# Patient Record
Sex: MALE | Race: WHITE | NOT HISPANIC OR LATINO | Employment: OTHER | URBAN - METROPOLITAN AREA
[De-identification: names, ages, dates, MRNs, and addresses within clinical notes are randomized per-mention and may not be internally consistent; named-entity substitution may affect disease eponyms.]

---

## 2017-03-07 ENCOUNTER — TRANSCRIBE ORDERS (OUTPATIENT)
Dept: ADMINISTRATIVE | Facility: HOSPITAL | Age: 57
End: 2017-03-07

## 2017-03-07 DIAGNOSIS — M79.89 LEFT LEG SWELLING: ICD-10-CM

## 2017-03-07 DIAGNOSIS — I89.0 LYMPHEDEMA: Primary | ICD-10-CM

## 2017-03-14 ENCOUNTER — APPOINTMENT (OUTPATIENT)
Dept: CT IMAGING | Facility: HOSPITAL | Age: 57
End: 2017-03-14
Attending: SURGERY

## 2017-03-20 ENCOUNTER — APPOINTMENT (OUTPATIENT)
Dept: CT IMAGING | Facility: HOSPITAL | Age: 57
End: 2017-03-20
Attending: SURGERY

## 2019-07-25 ENCOUNTER — HOSPITAL ENCOUNTER (OUTPATIENT)
Dept: URGENT CARE | Facility: CLINIC | Age: 59
Discharge: HOME | End: 2019-07-25
Attending: EMERGENCY MEDICINE

## 2019-10-03 ENCOUNTER — HOSPITAL ENCOUNTER (OUTPATIENT)
Dept: URGENT CARE | Facility: CLINIC | Age: 59
Discharge: HOME | End: 2019-10-03

## 2019-10-03 LAB
ALBUMIN SERPL-MCNC: 4.5 G/DL (ref 3.5–5)
ALBUMIN/GLOB SERPL: 1.7 {RATIO} (ref 1.4–2.6)
ALP SERPL-CCNC: 55 U/L (ref 56–119)
ALT SERPL-CCNC: 41 U/L (ref 10–40)
ANION GAP SERPL CALC-SCNC: 17 MMOL/L (ref 8–19)
AST SERPL-CCNC: 30 U/L (ref 15–50)
BILIRUB SERPL-MCNC: 1.03 MG/DL (ref 0.2–1.3)
BUN SERPL-MCNC: 13 MG/DL (ref 5–25)
BUN/CREAT SERPL: 13 {RATIO} (ref 6–20)
CALCIUM SERPL-MCNC: 10.1 MG/DL (ref 8.7–10.4)
CHLORIDE SERPL-SCNC: 101 MMOL/L (ref 99–111)
CONV CO2: 26 MMOL/L (ref 22–32)
CONV TOTAL PROTEIN: 7.1 G/DL (ref 6.3–8.2)
CREAT UR-MCNC: 1.02 MG/DL (ref 0.7–1.2)
GFR SERPLBLD BASED ON 1.73 SQ M-ARVRAT: >60 ML/MIN/{1.73_M2}
GLOBULIN UR ELPH-MCNC: 2.6 G/DL (ref 2–3.5)
GLUCOSE BLD-MCNC: 135 MG/DL (ref 70–99)
GLUCOSE SERPL-MCNC: 128 MG/DL (ref 70–99)
OSMOLALITY SERPL CALC.SUM OF ELEC: 292 MOSM/KG (ref 273–304)
POTASSIUM SERPL-SCNC: 3.8 MMOL/L (ref 3.5–5.3)
SODIUM SERPL-SCNC: 140 MMOL/L (ref 135–147)

## 2019-10-05 LAB — BACTERIA SPEC AEROBE CULT: NORMAL

## 2019-10-06 ENCOUNTER — HOSPITAL ENCOUNTER (OUTPATIENT)
Dept: URGENT CARE | Facility: CLINIC | Age: 59
Discharge: HOME | End: 2019-10-06

## 2020-05-21 ENCOUNTER — HOSPITAL ENCOUNTER (OUTPATIENT)
Dept: URGENT CARE | Facility: CLINIC | Age: 60
Discharge: HOME | End: 2020-05-21
Attending: FAMILY MEDICINE

## 2020-05-23 LAB — BACTERIA SPEC AEROBE CULT: NORMAL

## 2020-06-03 ENCOUNTER — HOSPITAL ENCOUNTER (OUTPATIENT)
Dept: URGENT CARE | Facility: CLINIC | Age: 60
Discharge: HOME | End: 2020-06-03
Attending: EMERGENCY MEDICINE

## 2020-06-25 ENCOUNTER — OFFICE VISIT CONVERTED (OUTPATIENT)
Dept: FAMILY MEDICINE CLINIC | Facility: CLINIC | Age: 60
End: 2020-06-25
Attending: NURSE PRACTITIONER

## 2020-06-25 ENCOUNTER — HOSPITAL ENCOUNTER (OUTPATIENT)
Dept: FAMILY MEDICINE CLINIC | Facility: CLINIC | Age: 60
Discharge: HOME | End: 2020-06-25
Attending: NURSE PRACTITIONER

## 2020-06-25 ENCOUNTER — CONVERSION ENCOUNTER (OUTPATIENT)
Dept: FAMILY MEDICINE CLINIC | Facility: CLINIC | Age: 60
End: 2020-06-25

## 2020-06-25 ENCOUNTER — HOSPITAL ENCOUNTER (OUTPATIENT)
Dept: LAB | Facility: HOSPITAL | Age: 60
Discharge: HOME | End: 2020-06-25
Attending: NURSE PRACTITIONER

## 2020-06-25 LAB
ALBUMIN SERPL-MCNC: 4.5 G/DL (ref 3.5–5)
ALBUMIN/GLOB SERPL: 1.6 {RATIO} (ref 1.4–2.6)
ALP SERPL-CCNC: 55 U/L (ref 56–155)
ALT SERPL-CCNC: 32 U/L (ref 10–40)
ANION GAP SERPL CALC-SCNC: 17 MMOL/L (ref 8–19)
AST SERPL-CCNC: 24 U/L (ref 15–50)
BASOPHILS # BLD AUTO: 0.03 10*3/UL (ref 0–0.2)
BASOPHILS NFR BLD AUTO: 0.4 % (ref 0–3)
BILIRUB SERPL-MCNC: 1.01 MG/DL (ref 0.2–1.3)
BUN SERPL-MCNC: 12 MG/DL (ref 5–25)
BUN/CREAT SERPL: 13 {RATIO} (ref 6–20)
CALCIUM SERPL-MCNC: 9.8 MG/DL (ref 8.7–10.4)
CHLORIDE SERPL-SCNC: 103 MMOL/L (ref 99–111)
CONV ABS IMM GRAN: 0.02 10*3/UL (ref 0–0.2)
CONV CO2: 21 MMOL/L (ref 22–32)
CONV IMMATURE GRAN: 0.3 % (ref 0–1.8)
CONV TOTAL PROTEIN: 7.3 G/DL (ref 6.3–8.2)
CREAT UR-MCNC: 0.93 MG/DL (ref 0.7–1.2)
DEPRECATED RDW RBC AUTO: 44.8 FL (ref 35.1–43.9)
EOSINOPHIL # BLD AUTO: 0.17 10*3/UL (ref 0–0.7)
EOSINOPHIL # BLD AUTO: 2.5 % (ref 0–7)
ERYTHROCYTE [DISTWIDTH] IN BLOOD BY AUTOMATED COUNT: 13.2 % (ref 11.6–14.4)
ERYTHROCYTE [SEDIMENTATION RATE] IN BLOOD: 4 MM/H (ref 0–20)
GFR SERPLBLD BASED ON 1.73 SQ M-ARVRAT: >60 ML/MIN/{1.73_M2}
GLOBULIN UR ELPH-MCNC: 2.8 G/DL (ref 2–3.5)
GLUCOSE SERPL-MCNC: 112 MG/DL (ref 70–99)
HCT VFR BLD AUTO: 50.1 % (ref 42–52)
HGB BLD-MCNC: 16.9 G/DL (ref 14–18)
LYMPHOCYTES # BLD AUTO: 1.82 10*3/UL (ref 1–5)
LYMPHOCYTES NFR BLD AUTO: 27.2 % (ref 20–45)
MCH RBC QN AUTO: 31 PG (ref 27–31)
MCHC RBC AUTO-ENTMCNC: 33.7 G/DL (ref 33–37)
MCV RBC AUTO: 91.9 FL (ref 80–96)
MONOCYTES # BLD AUTO: 0.6 10*3/UL (ref 0.2–1.2)
MONOCYTES NFR BLD AUTO: 9 % (ref 3–10)
NEUTROPHILS # BLD AUTO: 4.06 10*3/UL (ref 2–8)
NEUTROPHILS NFR BLD AUTO: 60.6 % (ref 30–85)
NRBC CBCN: 0 % (ref 0–0.7)
OSMOLALITY SERPL CALC.SUM OF ELEC: 285 MOSM/KG (ref 273–304)
PLATELET # BLD AUTO: 276 10*3/UL (ref 130–400)
PMV BLD AUTO: 11.7 FL (ref 9.4–12.4)
POTASSIUM SERPL-SCNC: 4 MMOL/L (ref 3.5–5.3)
RBC # BLD AUTO: 5.45 10*6/UL (ref 4.7–6.1)
SODIUM SERPL-SCNC: 137 MMOL/L (ref 135–147)
T4 FREE SERPL-MCNC: 1 NG/DL (ref 0.9–1.8)
TSH SERPL-ACNC: 3.68 M[IU]/L (ref 0.27–4.2)
WBC # BLD AUTO: 6.7 10*3/UL (ref 4.8–10.8)

## 2020-06-26 LAB
B BURGDOR IGG+IGM SER-ACNC: <0.91 ISR (ref 0–0.9)
CONV HEPATITIS B SURFACE AG W CONFIRMATION RE: NEGATIVE
HAV IGM SERPL QL IA: NEGATIVE
HBV CORE IGM SERPL QL IA: NEGATIVE
HCV AB SER DONR QL: <0.1 S/CO RATIO (ref 0–0.9)

## 2020-06-27 LAB — BACTERIA SPEC ANAEROBE CULT: NORMAL

## 2020-06-28 LAB
AMPICILLIN SUSC ISLT: <=2
BACTERIA SPEC AEROBE CULT: ABNORMAL
CEFEPIME SUSC ISLT: <=1
CEFTAZIDIME SUSC ISLT: 2
CIPROFLOXACIN SUSC ISLT: <=0.25
CIPROFLOXACIN SUSC ISLT: <=0.5
CONV GENTAMICIN HIGH LEVEL SYNERGY: ABNORMAL
CONV STREPTOMYCIN HIGH LEVEL SYNERGY: ABNORMAL
DAPTOMYCIN SUSC ISLT: 2
DOXYCYCLINE SUSC ISLT: 8
ERYTHROMYCIN SUSC ISLT: 4
GENTAMICIN SUSC ISLT: <=1
LEVOFLOXACIN SUSC ISLT: 0.25
LEVOFLOXACIN SUSC ISLT: 1
LINEZOLID SUSC ISLT: 2
TETRACYCLINE SUSC ISLT: >=16
TOBRAMYCIN SUSC ISLT: <=1
VANCOMYCIN SUSC ISLT: 1

## 2020-06-29 ENCOUNTER — OFFICE VISIT CONVERTED (OUTPATIENT)
Dept: SURGERY | Facility: CLINIC | Age: 60
End: 2020-06-29
Attending: SURGERY

## 2020-06-30 LAB
MYELOPEROXIDASE AB SER-ACNC: 0 AU/ML (ref 0–19)
PROTEINASE 3 (PR-3) AB: 0 AU/ML (ref 0–19)

## 2021-05-10 NOTE — H&P
History and Physical      Patient Name: Lisandro Miles   Patient ID: [id number]   Sex: Male   YOB: 1960    Primary Care Provider: Asiya VAIL   Referring Provider: Asiya VAIL    Visit Date: June 25, 2020    Provider: GENNA Camp   Location: [facility]   Location Address: [address] [place] [address], [address]  [place], [place]  [id number]   Location Phone: [phone]          Chief Complaint  · New patient - establish care      History Of Present Illness  Lisandro Miles is a 60 year old /White male who presents for evaluation and treatment of:      New patient to establish care.    Pt has c/o of rash on legs and arms. It is really bad to two places; left forearm and right inner thigh. Pt has had it for 5 weeks. It continues to spread even after two rounds of steroid/antibiotic.  Pt states it itches on occasion and stings and has opened up into wounds. Pt tries to keep it uncovered as much as possible.    states BP usually 130's/80's but hx white coat HTN      states had very similar episode last fall that resolved with anitbiotics and steroids- specificaly doxy and prednisone- was given this same regimen in ER 2 days ago, no change yet.     States now has red whelp-like patch on right inner arm that is hot to touch that started same day started doxycycline, states has had similar rash like reaction to another med in past but cannot remember what. denies any SOB or tightness in throat.       Past Medical History  Disease Name Date Onset Notes   AC joint arthropathy 09/01/2016 --    Bicipital tendinitis, right 09/01/2016 --    Complete tear of right rotator cuff 09/01/2016 --    Right arm pain --  --    Subacromial impingement, right 09/01/2016 --          Past Surgical History  Procedure Name Date Notes   Shoulder surgery 2018 left         Allergy List  Allergen Name Date Reaction Notes   NO KNOWN DRUG ALLERGIES --  --  --        Allergies Reconciled  Family Medical  "History  Disease Name Relative/Age Notes   *No Known Family History  --          Social History  Finding Status Start/Stop Quantity Notes   Alcohol Never --/-- --  --    Tobacco Never --/-- --  --          Review of Systems  · Constitutional  o Admits  o : fatigue   o Denies  o : night sweats  · Eyes  o Denies  o : double vision, blurred vision  · HENT  o Denies  o : vertigo, recent head injury  · Cardiovascular  o Denies  o : chest pain, irregular heart beats  · Respiratory  o Denies  o : shortness of breath, productive cough  · Gastrointestinal  o Denies  o : nausea, vomiting  · Genitourinary  o Denies  o : dysuria, urinary retention  · Integument  o Admits  o : skin lesions as described in HPI   o Denies  o : hair growth change,   · Neurologic  o Denies  o : altered mental status, seizures  · Musculoskeletal  o Denies  o : joint swelling, limitation of motion  · Endocrine  o Denies  o : cold intolerance, heat intolerance  · Heme-Lymph  o Denies  o : petechiae, lymph node enlargement or tenderness  · Allergic-Immunologic  o Denies  o : frequent illnesses      Vitals  Date Time BP Position Site L\R Cuff Size HR RR TEMP (F) WT  HT  BMI kg/m2 BSA m2 O2 Sat HC       06/25/2020 09:30 /113 Sitting    112 - R  98.2 302lbs 0oz 6'  2\" 38.77 2.67 93 %           manual /90., manual HR88       Physical Examination  · Constitutional  o Appearance  o : well developed, well-nourished, no acute distress  · Head and Face  o Head  o : normocephalic, atraumatic  · Ears, Nose, Mouth and Throat  o Ears  o :   § External Ears  § : external auditory canal appearance normal, no discharge present  § Otoscopic Examination  § : tympanic membranes pearly white/gray bilaterally  o Nose  o :   § External Nose  § : no lesions noted  § Nasopharynx  § : no discharge present  o Oral Cavity  o :   § Oral Mucosa  § : oral mucosa light pink  o Throat  o :   § Oropharynx  § : tonsils without exudate, no palatal " petechiae  · Neck  o Inspection/Palpation  o : normal appearance, no masses or tenderness, trachea midline  o Thyroid  o : gland size normal, nontender, no nodules or masses present on palpation  · Respiratory  o Respiratory Effort  o : breathing unlabored  o Inspection of Chest  o : chest rise symmetric bilaterally  o Auscultation of Lungs  o : clear to auscultation bilaterally throughout inspiration and expiration  · Cardiovascular  o Heart  o :   § Auscultation of Heart  § : regular rate and rhythm, no murmurs, gallops or rubs  o Peripheral Vascular System  o :   § Extremities  § : no edema  · Lymphatic  o Neck  o : no cervical lymphadenopathy, no supraclavicular lymphadenopathy  · Psychiatric  o Mood and Affect  o : mood normal, affect appropriate     right inner thigh with large area erythema with desquamation and blistering, smaller area in center with slightly deeper tissue loss, small amt serosanguineous drainage noted. hot to touch, ttp. left forearm with similar area, few other scattered smaller less significant lesions on chest/abd on left lower leg.   right inner upper arm with large erythematous raised whelp=like patches, hot to touch, non-tender, skin intact,           Assessment  · Screening for depression     V79.0/Z13.89  · Fatigue     780.79/R53.83  · Rash     782.1/R21  possible reaction to drug vs other   · Erosive pustular dermatosis     702.8/L98.8  wound culture done today- awaiting results. labs done to r/o auto-immune/vasculitis/infectious or other causes. referred to [facility] for wound care/poss. debridement and biopsy.attempted derm referral but unable to find anyone to accept insurance at present, pt to look into this   · Adverse effect of other drugs, medicaments and biological substances, initial encounter       Adverse effect of other drugs, medicaments and biological substances, initial encounter     995.29/T50.995A  questionable reaction to doxycycline- discontinue med and will switch to  clindamycin     Problems Reconciled  Plan  · Orders  o ACO-18: Negative screen for clinical depression using a standardized tool () - V79.0/Z13.89 - 2020  o CBC with Auto Diff [facility] (24486) - 780.79/R53.83 - 2020  o CMP [facility] (79327) - 780.79/R53.83 - 2020  o Thyroid Profile (26548, 49678, THYII) - 780.79/R53.83 - 2020  o ACO-39: Current medications updated and reviewed () - - 2020  o ACO-14: Influenza immunization was not administered for reasons documented () - - 2020   pt refused  o Lyme Antibody Screen (IgG/IgM) (20456) - 782.1/R21, 780.79/R53.83 - 2020  o ESR (14099) - 782.1/R21, 780.79/R53.83, 702.8/L98.8 - 2020  o ANCA vasculitides panel (96117, 35514) - 782.1/R21, 780.79/R53.83, 702.8/L98.8 - 2020  o Acute hepatitis panel (HAV IgM, HbcAb IgM, HbsAg, HCV) (61069, 24977, 31237, 14627, 39324) - 782.1/R21, 780.79/R53.83, 702.8/L98.8 - 2020  o General Surgery Consult (GNSUR) - 782.1/R21, 702.8/L98.8 - 2020   right inner thigh and left forearm- need wound eval/poss debridement/biopsy   o Wound Culture (16259) - 782.1/R21, 702.8/L98.8 - 2020   right inner thigh anaerobic   o Wound Culture (07279) - - 2020   right inner thigh aerobic   · Medications  o clindamycin HCl 300 mg oral capsule   SI tab PO q 8hr x 10day   DISP: (30) capsules with 0 refills  Prescribed on 2020     o prednisone 20 mg oral tablet   SIG: 3 tab PO QD for 3 days, then 2 tab PO QD for 2 days, then 1 tab PO QD for 2 days.   DISP: (15) tablet with 0 refills  Prescribed on 2020     o doxycycline monohydrate 100 mg oral capsule   SIG: take 1 capsule (100 mg) by oral route 2 times per day   DISP: (0) capsule with 0 refills  Discontinued on 2020     o Medications have been Reconciled  o Transition of Care or Provider Policy  · Instructions  o Depression Screen completed and scanned into the EMR under the designated folder within the patient's  documents.  o Today's PHQ-9 result is _3__  o Take all medications as prescribed/directed.  o Patient was educated/instructed on their diagnosis, treatment and medications prior to discharge from the clinic today.  o Patient instructed to seek medical attention urgently for new or worsening symptoms.  o Call the office with any concerns or questions.  o Discussed Covid-19 precautions including, but not limited to, social distancing, avoid touching your face, and hand washing.   · Disposition  o Call or Return if symptoms worsen or persist.            Electronically Signed by: GENNA Camp -Author on June 25, 2020 01:29:07 PM

## 2021-05-10 NOTE — H&P
History and Physical      Patient Name: Lisandro Miles   Patient ID: [id number]   Sex: Male   YOB: 1960    Primary Care Provider: Asiya VAIL   Referring Provider: Asiya VAIL    Visit Date: June 29, 2020    Provider: Maurice Kent MD   Location[facility]   Location Address: [address] [place] [address]  [id number]   Location Phone: [phone]          Chief Complaint  · Skin Lesion      History Of Present Illness  Lisandro Miles is a 60 year old /White male who presents to the office today as a consult from Asiya VAIL.      Was referred for a large skin lesion.  Patient had a skin lesion on his left calf recently that healed up on its own now he has a large skin lesion at his right thigh and also on his left forearm.  He has some burning and stinging at both of the areas but otherwise seems to be doing okay.  He had the same problem on his right leg I believe about a year ago and says he took steroids and the area cleared up on its own.  Patient is currently taking an antibiotic.  Was started on oral steroids a few days ago but this is the patient's third round of steroids for this issue.  Patient has not taken any unusual medications lately and he doesn't take any normal home medicines.       Past Medical History  Disease Name Date Onset Notes   AC joint arthropathy 09/01/2016 --    Bicipital tendinitis, right 09/01/2016 --    Complete tear of right rotator cuff 09/01/2016 --    Right arm pain --  --    Subacromial impingement, right 09/01/2016 --          Past Surgical History  Procedure Name Date Notes   Shoulder surgery 2018 left         Medication List  Name Date Started Instructions   Cipro 500 mg oral tablet 06/29/2020 take 1 tablet (500 mg) by oral route every 12 hours for 10 days   clindamycin HCl 300 mg oral capsule 06/25/2020 1 tab PO q 8hr x 10day   prednisone 20 mg oral tablet 06/25/2020 3 tab PO QD for 3 days, then 2 tab PO QD  "for 2 days, then 1 tab PO QD for 2 days.         Allergy List  Allergen Name Date Reaction Notes   NO KNOWN DRUG ALLERGIES --  --  --        Allergies Reconciled  Family Medical History  Disease Name Relative/Age Notes   *No Known Family History  --          Social History  Finding Status Start/Stop Quantity Notes   Alcohol Never --/-- --  --    Tobacco Never --/-- --  --          Review of Systems  · Constitutional  o Denies  o : chills, fever  · Gastrointestinal  o Denies  o : nausea, vomiting      Vitals  Date Time BP Position Site L\R Cuff Size HR RR TEMP (F) WT  HT  BMI kg/m2 BSA m2 O2 Sat HC       06/29/2020 11:56 AM       12  304lbs 2oz 6'  2\" 39.05 2.68           Physical Examination  · Constitutional  o Appearance  o : healthy appearing, alert and in no acute distress, reliable historian  · Head and Face  o Head  o :   § Inspection  § : no visable deformities or lesions  · Eyes  o Conjunctivae  o : clear  o Sclerae  o : clear  · Neck  o Inspection/Palpation  o : normal appearance, no masses, trachea midline  · Respiratory  o Respiratory Effort  o : breathing unlabored, respiratory effort appears normal  o Inspection of Chest  o : normal appearance, no retractions  · Cardiovascular  o Heart  o : regular rate and rhythm  · Gastrointestinal  o Abdominal Examination  o :   § Abdomen  § : soft, nontender, nondistended  · Skin and Subcutaneous Tissue  o General Inspection  o : Tubes 100 cm x 130 cm size reddish colored wound at the right thigh there appears to be loss of the epidermis and some of the dermis at different areas of the wound. The wound is dry. At the left forearm there is a similar appearing lesion but smaller in size measuring about 10 cm x 15 cm. Both of the areas are not tender to palpation. Is able to move both of the affected extremities without difficulty.  · Neurologic  o Cranial Nerves  o : no obvious motor deficits  o Sensation  o : no obvious sensory deficits  o Gait and Station  o : "   § Gait Screening  § : normal gait, able to stand without diffculty  o Cerebellar Function  o : no obvious abnormalities  · Psychiatric  o Judgement and Insight  o : judgment and insight intact  o Mood and Affect  o : mood normal, affect appropriate          Assessment  · Skin lesion     709.9/L98.9  Etiology of the skin lesions are unclear. Patient needs to be seen by a dermatologist. I will defer biopsy of any of the lesions and leave any decision regarding that to the dermatologist.    Problems Reconciled  Plan  · Medications  o Medications have been Reconciled  o Transition of Care or Provider Policy  · Instructions  o Electronically Identified Patient Education Materials Provided Electronically     Dermatology referral.  However, there is some difficulty getting the patient referred to a dermatologist secondary to the type of insurance he has.  I told the patient that if we are not able to get a referral set up sometime in the next few days that he should go to the emergency room at the [facility] where there should be physicians with the appropriate areas of active expertise to address this issue.             Electronically Signed by: Kiah Potts-, -Author on July 13, 2020 01:00:15 PM  Electronically Co-signed by: Maurice Kent MD -Reviewer on July 22, 2020 07:30:19 AM

## 2021-05-15 VITALS — HEIGHT: 74 IN | RESPIRATION RATE: 12 BRPM | WEIGHT: 304.12 LBS | BODY MASS INDEX: 39.03 KG/M2

## 2021-05-15 VITALS
HEIGHT: 74 IN | BODY MASS INDEX: 38.76 KG/M2 | OXYGEN SATURATION: 93 % | SYSTOLIC BLOOD PRESSURE: 143 MMHG | HEART RATE: 112 BPM | WEIGHT: 302 LBS | TEMPERATURE: 98.2 F | DIASTOLIC BLOOD PRESSURE: 113 MMHG